# Patient Record
Sex: MALE | NOT HISPANIC OR LATINO | Employment: FULL TIME | URBAN - METROPOLITAN AREA
[De-identification: names, ages, dates, MRNs, and addresses within clinical notes are randomized per-mention and may not be internally consistent; named-entity substitution may affect disease eponyms.]

---
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
## 2025-03-27 ENCOUNTER — HOSPITAL ENCOUNTER (EMERGENCY)
Facility: HOSPITAL | Age: 20
Discharge: HOME | End: 2025-03-27
Attending: EMERGENCY MEDICINE
Payer: COMMERCIAL

## 2025-03-27 VITALS
RESPIRATION RATE: 20 BRPM | WEIGHT: 158.51 LBS | OXYGEN SATURATION: 100 % | HEART RATE: 70 BPM | SYSTOLIC BLOOD PRESSURE: 140 MMHG | TEMPERATURE: 98.1 F | DIASTOLIC BLOOD PRESSURE: 80 MMHG

## 2025-03-27 DIAGNOSIS — S01.81XA FACIAL LACERATION, INITIAL ENCOUNTER: Primary | ICD-10-CM

## 2025-03-27 PROCEDURE — 90715 TDAP VACCINE 7 YRS/> IM: CPT | Performed by: EMERGENCY MEDICINE

## 2025-03-27 PROCEDURE — 90471 IMMUNIZATION ADMIN: CPT

## 2025-03-27 PROCEDURE — 99282 EMERGENCY DEPT VISIT SF MDM: CPT

## 2025-03-27 PROCEDURE — 99284 EMERGENCY DEPT VISIT MOD MDM: CPT | Performed by: EMERGENCY MEDICINE

## 2025-03-27 PROCEDURE — 12013 RPR F/E/E/N/L/M 2.6-5.0 CM: CPT | Performed by: EMERGENCY MEDICINE

## 2025-03-27 RX ORDER — LIDOCAINE HYDROCHLORIDE AND EPINEPHRINE 10; 10 MG/ML; UG/ML
20 INJECTION, SOLUTION INFILTRATION; PERINEURAL ONCE
Status: COMPLETED | OUTPATIENT
Start: 2025-03-27 | End: 2025-03-27

## 2025-03-27 RX ORDER — GINSENG 100 MG
1 CAPSULE ORAL ONCE
Status: COMPLETED | OUTPATIENT
Start: 2025-03-27 | End: 2025-03-27

## 2025-03-27 RX ADMIN — LIDOCAINE HYDROCHLORIDE,EPINEPHRINE BITARTRATE 20 ML: 10; .01 INJECTION, SOLUTION INFILTRATION; PERINEURAL at 19:57

## 2025-03-27 RX ADMIN — TETANUS TOXOID, REDUCED DIPHTHERIA TOXOID AND ACELLULAR PERTUSSIS VACCINE, ADSORBED 0.5 ML: 5; 2.5; 8; 8; 2.5 SUSPENSION INTRAMUSCULAR at 20:02

## 2025-03-27 RX ADMIN — BACITRACIN 1 SMALL APPLICATION: 500 OINTMENT TOPICAL at 19:57

## 2025-03-27 NOTE — ED PROVIDER NOTES
"  ED Disposition       None          Assessment & Plan       Medical Decision Making  Considered but doubt head neck injury.  Doubt intracranial hemorrhage.  Doubt orbital fracture.  No evidence of ocular trauma.  Laceration was closed.  Appropriate for discharge and outpatient management.    Risk  OTC drugs.  Prescription drug management.  Decision regarding hospitalization.             Medications   lidocaine-epinephrine (XYLOCAINE/EPINEPHRINE) 1 %-1:100,000 injection 20 mL (has no administration in time range)   bacitracin topical ointment 1 small application (has no administration in time range)   tetanus-diphtheria-acellular pertussis (BOOSTRIX) IM injection 0.5 mL (has no administration in time range)       ED Risk Strat Scores              CRAFFT      Flowsheet Row Most Recent Value   CRAFFT Initial Screen: During the past 12 months, did you:    1. Drink any alcohol (more than a few sips)?  No Filed at: 03/27/2025 1949   2. Smoke any marijuana or hashish No Filed at: 03/27/2025 1949   3. Use anything else to get high? (\"anything else\" includes illegal drugs, over the counter and prescription drugs, and things that you sniff or 'santos')? No Filed at: 03/27/2025 1949                                          History of Present Illness       Chief Complaint   Patient presents with    Facial Laceration     While playing basketball got hit with the other players elbow.        History reviewed. No pertinent past medical history.   History reviewed. No pertinent surgical history.   History reviewed. No pertinent family history.   Social History     Tobacco Use    Smoking status: Never    Smokeless tobacco: Never   Substance Use Topics    Alcohol use: Yes    Drug use: Yes      E-Cigarette/Vaping      E-Cigarette/Vaping Substances      I have reviewed and agree with the history as documented.     Patient is a 20-year-old male.  40 minutes prior to arrival he was elbowed to the left supraorbital area while playing " basketball.  He stained a laceration.  Last tetanus vaccination is not known.  No loss of consciousness.  No headache.  No neck pain.  No nausea or vomiting.  Patient denies being struck in the eye.  No eye pain.  No diplopia.  No vision loss.        Review of Systems   Eyes:  Negative for pain and visual disturbance.   Gastrointestinal:  Negative for nausea and vomiting.   Skin:  Positive for wound.   Neurological:  Negative for headaches.   All other systems reviewed and are negative.          Objective       ED Triage Vitals [03/27/25 1946]   Temperature Pulse Blood Pressure Respirations SpO2 Patient Position - Orthostatic VS   98.1 °F (36.7 °C) 70 140/80 20 100 % Lying      Temp Source Heart Rate Source BP Location FiO2 (%) Pain Score    Oral Monitor Right arm -- --      Vitals      Date and Time Temp Pulse SpO2 Resp BP Pain Score FACES Pain Rating User   03/27/25 1946 98.1 °F (36.7 °C) 70 100 % 20 140/80 -- -- KR            Physical Exam  Vitals reviewed.   Constitutional:       General: He is not in acute distress.  HENT:      Head: Normocephalic.      Comments: There is a 3 cm vertical laceration to the left supraorbital area.  Does not involve the eyebrow.  No bony tenderness or step-off.  No crepitus.     Nose: Nose normal.      Comments: No tenderness or crepitus.  No epistaxis.  Eyes:      Extraocular Movements: Extraocular movements intact.      Conjunctiva/sclera: Conjunctivae normal.      Pupils: Pupils are equal, round, and reactive to light.   Pulmonary:      Effort: No respiratory distress.      Breath sounds: Normal breath sounds.   Musculoskeletal:         General: No swelling, tenderness, deformity or signs of injury.      Cervical back: No tenderness.   Skin:     General: Skin is warm and dry.   Neurological:      General: No focal deficit present.      Mental Status: He is alert and oriented to person, place, and time.      GCS: GCS eye subscore is 4. GCS verbal subscore is 5. GCS motor  subscore is 6.   Psychiatric:         Mood and Affect: Mood normal.         Behavior: Behavior normal.         Results Reviewed       None            No orders to display       Laceration repair    Date/Time: 3/27/2025 8:24 PM    Performed by: Jason Meyer MD  Authorized by: Jason Meyer MD      Procedure Details:  Comments: 3 cm left supraorbital laceration was anesthetized using 1% lidocaine with epinephrine.  It was cleaned with normal saline.  It was explored.  No foreign body.  Laceration was prepped and draped in usual sterile manner.  Laceration was closed with 6 simple intermittent 6-0 nylon sutures.  Wound was well-approximated.  Good hemostasis.  Bacitracin applied.  Patient tolerated procedure well.          ED Medication and Procedure Management   None     Patient's Medications    No medications on file     No discharge procedures on file.  ED SEPSIS DOCUMENTATION            Jason Meyer MD  03/27/25 2028

## 2025-03-27 NOTE — Clinical Note
Ki Cordoba was seen and treated in our emergency department on 3/27/2025.                Diagnosis:     Ki  may return to work on return date.    He may return on this date: 03/29/2025         If you have any questions or concerns, please don't hesitate to call.      Jason Meyer MD    ______________________________           _______________          _______________  Hospital Representative                              Date                                Time